# Patient Record
Sex: MALE | Race: WHITE | NOT HISPANIC OR LATINO | Employment: STUDENT | URBAN - METROPOLITAN AREA
[De-identification: names, ages, dates, MRNs, and addresses within clinical notes are randomized per-mention and may not be internally consistent; named-entity substitution may affect disease eponyms.]

---
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
## 2024-04-20 ENCOUNTER — HOSPITAL ENCOUNTER (EMERGENCY)
Facility: HOSPITAL | Age: 19
Discharge: HOME | End: 2024-04-20
Attending: EMERGENCY MEDICINE
Payer: OTHER MISCELLANEOUS

## 2024-04-20 ENCOUNTER — APPOINTMENT (EMERGENCY)
Dept: RADIOLOGY | Facility: HOSPITAL | Age: 19
End: 2024-04-20
Payer: OTHER MISCELLANEOUS

## 2024-04-20 VITALS
OXYGEN SATURATION: 100 % | DIASTOLIC BLOOD PRESSURE: 81 MMHG | SYSTOLIC BLOOD PRESSURE: 156 MMHG | TEMPERATURE: 98.1 F | RESPIRATION RATE: 18 BRPM | HEART RATE: 76 BPM

## 2024-04-20 DIAGNOSIS — S93.401A RIGHT ANKLE SPRAIN: Primary | ICD-10-CM

## 2024-04-20 PROCEDURE — 73590 X-RAY EXAM OF LOWER LEG: CPT

## 2024-04-20 PROCEDURE — 99283 EMERGENCY DEPT VISIT LOW MDM: CPT

## 2024-04-20 PROCEDURE — 73610 X-RAY EXAM OF ANKLE: CPT

## 2024-04-20 PROCEDURE — 73630 X-RAY EXAM OF FOOT: CPT

## 2024-04-20 PROCEDURE — 99284 EMERGENCY DEPT VISIT MOD MDM: CPT | Performed by: EMERGENCY MEDICINE

## 2024-04-20 PROCEDURE — 96372 THER/PROPH/DIAG INJ SC/IM: CPT

## 2024-04-20 RX ORDER — ACETAMINOPHEN 325 MG/1
975 TABLET ORAL ONCE
Status: COMPLETED | OUTPATIENT
Start: 2024-04-20 | End: 2024-04-20

## 2024-04-20 RX ORDER — KETOROLAC TROMETHAMINE 30 MG/ML
30 INJECTION, SOLUTION INTRAMUSCULAR; INTRAVENOUS ONCE
Status: COMPLETED | OUTPATIENT
Start: 2024-04-20 | End: 2024-04-20

## 2024-04-20 RX ADMIN — KETOROLAC TROMETHAMINE 30 MG: 30 INJECTION, SOLUTION INTRAMUSCULAR; INTRAVENOUS at 19:28

## 2024-04-20 RX ADMIN — ACETAMINOPHEN 325MG 975 MG: 325 TABLET ORAL at 19:27

## 2024-04-20 NOTE — Clinical Note
Chris Foreman was seen and treated in our emergency department on 4/20/2024.        No work until cleared by Family Doctor/Orthopedics        Diagnosis:     Chris  .    He may return on this date:          If you have any questions or concerns, please don't hesitate to call.      Lucio Tineo MD    ______________________________           _______________          _______________  Hospital Representative                              Date                                Time

## 2024-04-20 NOTE — ED PROVIDER NOTES
History  Chief Complaint   Patient presents with    Ankle Pain     Pt reports lost footing on steps and right ankle twisted, reports pain and swelling. EMS stabilized ankle pta.         History provided by:  Patient   used: No    Ankle Pain  Location:  Ankle  Time since incident:  1 hour  Injury: yes    Mechanism of injury comment:  Twisted Right ankle while coming down steps  Ankle location:  R ankle  Pain details:     Quality:  Aching    Radiates to:  Does not radiate    Severity:  Moderate    Onset quality:  Gradual    Duration:  1 hour    Timing:  Constant    Progression:  Unchanged  Chronicity:  New  Dislocation: no    Foreign body present:  Unable to specify  Tetanus status:  Unknown  Prior injury to area:  Unable to specify  Relieved by:  Nothing  Worsened by:  Nothing  Ineffective treatments:  None tried  Associated symptoms: decreased ROM and swelling    Associated symptoms: no back pain, no fever, no neck pain, no numbness and no tingling    Swelling:     Location: Right ankle.    Onset quality:  Sudden    Duration:  1 hour    Timing:  Constant    Progression:  Unchanged    Chronicity:  New  Risk factors: obesity        None       History reviewed. No pertinent past medical history.    History reviewed. No pertinent surgical history.    History reviewed. No pertinent family history.  I have reviewed and agree with the history as documented.    E-Cigarette/Vaping     E-Cigarette/Vaping Substances     Social History     Tobacco Use    Smoking status: Never    Smokeless tobacco: Never   Substance Use Topics    Alcohol use: Not Currently    Drug use: Not Currently       Review of Systems   Constitutional:  Negative for chills and fever.   HENT:  Negative for facial swelling, sore throat and trouble swallowing.    Eyes:  Negative for pain and visual disturbance.   Respiratory:  Negative for cough, chest tightness and shortness of breath.    Cardiovascular:  Negative for chest pain and leg  swelling.   Gastrointestinal:  Negative for abdominal pain, diarrhea, nausea and vomiting.   Genitourinary:  Negative for dysuria and flank pain.   Musculoskeletal:  Negative for back pain, neck pain and neck stiffness.        Right ankle pain   Skin:  Negative for pallor and rash.   Allergic/Immunologic: Negative for environmental allergies and immunocompromised state.   Neurological:  Negative for dizziness and headaches.   Hematological:  Negative for adenopathy. Does not bruise/bleed easily.   Psychiatric/Behavioral:  Negative for agitation and behavioral problems.    All other systems reviewed and are negative.      Physical Exam  Physical Exam  Vitals and nursing note reviewed.   Constitutional:       General: He is not in acute distress.     Appearance: He is well-developed.   HENT:      Head: Normocephalic and atraumatic.   Eyes:      Extraocular Movements: Extraocular movements intact.   Cardiovascular:      Rate and Rhythm: Normal rate and regular rhythm.   Pulmonary:      Effort: Pulmonary effort is normal. No respiratory distress.   Abdominal:      Palpations: Abdomen is soft.      Tenderness: There is no abdominal tenderness. There is no guarding or rebound.   Musculoskeletal:      Cervical back: Normal range of motion and neck supple.      Comments: Right ankle: Swelling and tenderness over the medial malleolus, no deformity; no right shin or upper fibular/tibial tenderness, neurovascular intact distally   Skin:     General: Skin is warm and dry.   Neurological:      General: No focal deficit present.      Mental Status: He is alert and oriented to person, place, and time.   Psychiatric:         Mood and Affect: Mood normal.         Behavior: Behavior normal.         Vital Signs  ED Triage Vitals [04/20/24 1911]   Temperature Pulse Respirations Blood Pressure SpO2   98.1 °F (36.7 °C) 76 18 156/81 100 %      Temp Source Heart Rate Source Patient Position - Orthostatic VS BP Location FiO2 (%)   Oral  "Monitor Sitting Right arm --      Pain Score       7           Vitals:    04/20/24 1911   BP: 156/81   Pulse: 76   Patient Position - Orthostatic VS: Sitting         Visual Acuity      ED Medications  Medications   ketorolac (TORADOL) injection 30 mg (30 mg Intramuscular Given 4/20/24 1928)   acetaminophen (TYLENOL) tablet 975 mg (975 mg Oral Given 4/20/24 1927)       Diagnostic Studies  Results Reviewed       None                   XR ankle 3+ views RIGHT    (Results Pending)   XR tibia fibula 2 views RIGHT    (Results Pending)   XR foot 3+ views RIGHT    (Results Pending)              Procedures  Procedures         ED Course  ED Course as of 04/20/24 2102   Sat Apr 20, 2024 2004 XR foot 3+ views RIGHT   2007 XR ankle 3+ views RIGHT   2007 XR tibia fibula 2 views RIGHT  X-rays of right ankle, right foot, tibia-fibula intimately reviewed, no displaced fracture noted.   2100 Patient reevaluated, informed about the x-ray interpretation, patient is pretty tender over the right medial malleolus, will put sugar-tong's splint, give crutches, follow-up with orthopedics, advised OTC pain meds, ice, elevation.         HALIMA      Flowsheet Row Most Recent Value   HALIMA Initial Screen: During the past 12 months, did you:    1. Drink any alcohol (more than a few sips)?  No Filed at: 04/20/2024 1913   2. Smoke any marijuana or hashish No Filed at: 04/20/2024 1913   3. Use anything else to get high? (\"anything else\" includes illegal drugs, over the counter and prescription drugs, and things that you sniff or 'gomez')? No Filed at: 04/20/2024 1913                                            Medical Decision Making  Patient with twisted right ankle while coming down steps, complaint of pain to the right ankle, did not fall, brought by EMS from work.  On exam, patient has swelling and tenderness over right ankle medially, no deformity, no proximal fibular tenderness.  Differential diagnosis: Ankle sprain, rule out fracture, will " get x-rays, give pain meds, ice.    Problems Addressed:  Right ankle sprain: acute illness or injury    Amount and/or Complexity of Data Reviewed  Radiology: ordered and independent interpretation performed. Decision-making details documented in ED Course.    Risk  OTC drugs.  Prescription drug management.             Disposition  Final diagnoses:   Right ankle sprain     Time reflects when diagnosis was documented in both MDM as applicable and the Disposition within this note       Time User Action Codes Description Comment    4/20/2024  7:55 PM Lucio Tineo Add [S93.401A] Right ankle sprain           ED Disposition       ED Disposition   Discharge    Condition   Stable    Date/Time   Sat Apr 20, 2024 2101    Comment   Chris Foreman discharge to home/self care.                   Follow-up Information       Follow up With Specialties Details Why Contact Info Additional Information    Ronal Rose MD Internal Medicine Schedule an appointment as soon as possible for a visit   [address] [place] [address] [address]  [facility] [address]  [phone]       [facility] Orthopedic [facility] Orthopedic Surgery Schedule an appointment as soon as possible for a visit   [address]  [address]  [facility] [address] [phone] [facility] Orthopedic [facility] [place] [address] [address], [address], [place], [place], [address] [phone]            Patient's Medications    No medications on file       No discharge procedures on file.    PDMP Review       None            ED Provider  Electronically Signed by             Lucio Tineo MD  04/21/24 1679

## 2024-04-21 NOTE — DISCHARGE INSTRUCTIONS
Please take over-the-counter Tylenol and ibuprofen for pain, keep the affected area elevated, apply ice at intervals, follow-up with primary doctor and orthopedics.

## 2024-06-16 ENCOUNTER — HOSPITAL ENCOUNTER (EMERGENCY)
Facility: HOSPITAL | Age: 19
Discharge: HOME | End: 2024-06-16
Attending: EMERGENCY MEDICINE
Payer: MEDICARE

## 2024-06-16 VITALS
RESPIRATION RATE: 18 BRPM | DIASTOLIC BLOOD PRESSURE: 68 MMHG | WEIGHT: 315 LBS | HEART RATE: 89 BPM | OXYGEN SATURATION: 96 % | SYSTOLIC BLOOD PRESSURE: 131 MMHG | TEMPERATURE: 98 F

## 2024-06-16 DIAGNOSIS — L03.114 CELLULITIS OF LEFT UPPER EXTREMITY: Primary | ICD-10-CM

## 2024-06-16 PROCEDURE — 99282 EMERGENCY DEPT VISIT SF MDM: CPT

## 2024-06-16 PROCEDURE — 99284 EMERGENCY DEPT VISIT MOD MDM: CPT | Performed by: EMERGENCY MEDICINE

## 2024-06-16 RX ORDER — CEPHALEXIN 500 MG/1
500 CAPSULE ORAL EVERY 6 HOURS SCHEDULED
Qty: 28 CAPSULE | Refills: 0 | Status: SHIPPED | OUTPATIENT
Start: 2024-06-16 | End: 2024-06-23

## 2024-06-16 RX ORDER — NAPROXEN 500 MG/1
500 TABLET ORAL EVERY 12 HOURS PRN
Qty: 15 TABLET | Refills: 0 | Status: SHIPPED | OUTPATIENT
Start: 2024-06-16

## 2024-06-16 NOTE — ED PROVIDER NOTES
History  Chief Complaint   Patient presents with    Skin Problem     Pt states that he got a new tattoo about a week ago, c/o swelling and redness surrounding the area. C/o pain when moving arm.      18-year-old male with no past medical history presents to the ED complaining of pain and redness around new tattoo dorsal aspect left forearm.  He states he got the tattoo about a week ago and a few days ago noticed some redness, swelling and pain around part of the tattoo.  He states he is also noticed some drainage.  No fevers or chills.  No nausea or vomiting.      Medical Problem  Location:  Dorsal aspect left forearm  Onset quality:  Gradual  Duration:  2 days  Timing:  Constant  Progression:  Worsening  Chronicity:  New  Context:  Tattoo 1 week ago.  Now over the last 2 days noticed redness around tattoo with some drainage, pain and swelling  Ineffective treatments:  Nothing tried  Associated symptoms: no fever, no headaches, no nausea and no vomiting        None       History reviewed. No pertinent past medical history.    History reviewed. No pertinent surgical history.    History reviewed. No pertinent family history.  I have reviewed and agree with the history as documented.    E-Cigarette/Vaping     E-Cigarette/Vaping Substances     Social History     Tobacco Use    Smoking status: Never    Smokeless tobacco: Never   Substance Use Topics    Alcohol use: Not Currently    Drug use: Not Currently       Review of Systems   Constitutional: Negative.  Negative for chills and fever.   HENT: Negative.     Eyes: Negative.    Respiratory: Negative.     Cardiovascular: Negative.    Gastrointestinal: Negative.  Negative for nausea and vomiting.   Genitourinary: Negative.    Musculoskeletal:  Negative for neck pain.   Skin: Negative.    Allergic/Immunologic: Negative.    Neurological: Negative.  Negative for weakness, numbness and headaches.   Hematological: Negative.    Psychiatric/Behavioral: Negative.     All other  systems reviewed and are negative.      Physical Exam  Physical Exam  Vitals and nursing note reviewed.   Constitutional:       General: He is awake. He is not in acute distress.     Appearance: Normal appearance. He is well-developed and overweight. He is not ill-appearing, toxic-appearing or diaphoretic.   HENT:      Head: Normocephalic and atraumatic.      Right Ear: External ear normal.      Left Ear: External ear normal.      Nose: Nose normal.      Mouth/Throat:      Mouth: Oropharynx is clear and moist.   Eyes:      General: No scleral icterus.     Extraocular Movements: EOM normal.      Conjunctiva/sclera: Conjunctivae normal.   Neck:      Thyroid: No thyromegaly.      Vascular: No JVD.   Musculoskeletal:         General: Swelling and tenderness (Over tattoo) present. No edema. Normal range of motion.      Comments: See clinical image on chart.  Minimal surrounding erythema from distal aspect tattoo.  No induration or fluctuance.  No drainage noted   Skin:     General: Skin is warm and dry.      Coloration: Skin is not jaundiced or pale.      Findings: No bruising, erythema, lesion or rash.   Neurological:      General: No focal deficit present.      Mental Status: He is alert and oriented to person, place, and time.      Motor: No weakness.      Deep Tendon Reflexes: Reflexes are normal and symmetric.   Psychiatric:         Mood and Affect: Mood and affect and mood normal.         Behavior: Behavior is cooperative.         Vital Signs  ED Triage Vitals [06/16/24 1709]   Temperature Pulse Respirations Blood Pressure SpO2   98 °F (36.7 °C) 89 18 131/68 96 %      Temp Source Heart Rate Source Patient Position - Orthostatic VS BP Location FiO2 (%)   Oral Monitor -- -- --      Pain Score       --           Vitals:    06/16/24 1709   BP: 131/68   Pulse: 89         Visual Acuity      ED Medications  Medications - No data to display    Diagnostic Studies  Results Reviewed       None                   No orders to  display              Procedures  Procedures         ED Course                                             Medical Decision Making  18-year-old male presents to the ED with pain, redness and drainage to dorsal aspect left wrist at site of tattoo.  He got the tattoo about a week ago.  No fevers or chills.  On exam he looks well in no distress.  He does have some mild erythema surrounding the distal aspect of the tattoo.  No induration, fluctuance or active drainage.  Will treat for cellulitis.  Vital signs are stable.  Patient is stable for discharge             Disposition  Final diagnoses:   Cellulitis of left upper extremity     Time reflects when diagnosis was documented in both MDM as applicable and the Disposition within this note       Time User Action Codes Description Comment    6/16/2024  5:30 PM Meliza Toure Add [L03.114] Cellulitis of left upper extremity           ED Disposition       ED Disposition   Discharge    Condition   Good    Date/Time   Sun Jun 16, 2024  5:30 PM    Comment   Chris Foreman discharge to home/self care.                   Follow-up Information       Follow up With Specialties Details Why Contact Info    Ronal Rose MD Internal Medicine Schedule an appointment as soon as possible for a visit in 2 days If symptoms worsen or return to the emergency department [address] [place] [address]  [phone]              Patient's Medications   Discharge Prescriptions    CEPHALEXIN (KEFLEX) 500 MG CAPSULE    Take 1 capsule (500 mg total) by mouth every 6 (six) hours for 7 days       Start Date: 6/16/2024 End Date: 6/23/2024       Order Dose: 500 mg       Quantity: 28 capsule    Refills: 0    NAPROXEN (NAPROSYN) 500 MG TABLET    Take 1 tablet (500 mg total) by mouth every 12 (twelve) hours as needed for moderate pain or mild pain       Start Date: 6/16/2024 End Date: --       Order Dose: 500 mg       Quantity: 15 tablet    Refills: 0       No discharge  procedures on file.    PDMP Review       None            ED Provider  Electronically Signed by             Meliza Toure,   06/16/24 3698

## 2024-06-16 NOTE — Clinical Note
Chris Foreman was seen and treated in our emergency department on 6/16/2024.    No restrictions            Diagnosis:     Chris  may return to work on return date.    He may return on this date: 06/21/2024         If you have any questions or concerns, please don't hesitate to call.      Meliza Toure, DO    ______________________________           _______________          _______________  Hospital Representative                              Date                                Time

## 2024-12-18 ENCOUNTER — APPOINTMENT (EMERGENCY)
Dept: RADIOLOGY | Facility: HOSPITAL | Age: 19
End: 2024-12-18
Payer: MEDICARE

## 2024-12-18 ENCOUNTER — HOSPITAL ENCOUNTER (EMERGENCY)
Facility: HOSPITAL | Age: 19
Discharge: HOME | End: 2024-12-19
Attending: EMERGENCY MEDICINE
Payer: MEDICARE

## 2024-12-18 VITALS
DIASTOLIC BLOOD PRESSURE: 98 MMHG | HEART RATE: 82 BPM | OXYGEN SATURATION: 98 % | RESPIRATION RATE: 18 BRPM | WEIGHT: 315 LBS | TEMPERATURE: 98.1 F | SYSTOLIC BLOOD PRESSURE: 132 MMHG

## 2024-12-18 DIAGNOSIS — M54.9 BACK PAIN: Primary | ICD-10-CM

## 2024-12-18 LAB
ALBUMIN SERPL BCG-MCNC: 4.8 G/DL (ref 3.5–5)
ALP SERPL-CCNC: 56 U/L (ref 34–104)
ALT SERPL W P-5'-P-CCNC: 31 U/L (ref 7–52)
ANION GAP SERPL CALCULATED.3IONS-SCNC: 8 MMOL/L (ref 4–13)
AST SERPL W P-5'-P-CCNC: 20 U/L (ref 13–39)
BACTERIA UR QL AUTO: ABNORMAL /HPF
BASOPHILS # BLD AUTO: 0.1 THOUSANDS/ΜL (ref 0–0.1)
BASOPHILS NFR BLD AUTO: 1 % (ref 0–1)
BILIRUB SERPL-MCNC: 0.39 MG/DL (ref 0.2–1)
BILIRUB UR QL STRIP: NEGATIVE
BUN SERPL-MCNC: 11 MG/DL (ref 5–25)
CALCIUM SERPL-MCNC: 9.4 MG/DL (ref 8.4–10.2)
CHLORIDE SERPL-SCNC: 105 MMOL/L (ref 96–108)
CLARITY UR: CLEAR
CO2 SERPL-SCNC: 25 MMOL/L (ref 21–32)
COLOR UR: YELLOW
CREAT SERPL-MCNC: 0.85 MG/DL (ref 0.6–1.3)
EOSINOPHIL # BLD AUTO: 0.18 THOUSAND/ΜL (ref 0–0.61)
EOSINOPHIL NFR BLD AUTO: 2 % (ref 0–6)
ERYTHROCYTE [DISTWIDTH] IN BLOOD BY AUTOMATED COUNT: 11.9 % (ref 11.6–15.1)
GFR SERPL CREATININE-BSD FRML MDRD: 125 ML/MIN/1.73SQ M
GLUCOSE SERPL-MCNC: 95 MG/DL (ref 65–140)
GLUCOSE UR STRIP-MCNC: NEGATIVE MG/DL
HCT VFR BLD AUTO: 44.9 % (ref 36.5–49.3)
HGB BLD-MCNC: 15.7 G/DL (ref 12–17)
HGB UR QL STRIP.AUTO: NEGATIVE
IMM GRANULOCYTES # BLD AUTO: 0.02 THOUSAND/UL (ref 0–0.2)
IMM GRANULOCYTES NFR BLD AUTO: 0 % (ref 0–2)
KETONES UR STRIP-MCNC: NEGATIVE MG/DL
LEUKOCYTE ESTERASE UR QL STRIP: NEGATIVE
LYMPHOCYTES # BLD AUTO: 2.45 THOUSANDS/ΜL (ref 0.6–4.47)
LYMPHOCYTES NFR BLD AUTO: 26 % (ref 14–44)
MCH RBC QN AUTO: 28.8 PG (ref 26.8–34.3)
MCHC RBC AUTO-ENTMCNC: 35 G/DL (ref 31.4–37.4)
MCV RBC AUTO: 82 FL (ref 82–98)
MONOCYTES # BLD AUTO: 0.74 THOUSAND/ΜL (ref 0.17–1.22)
MONOCYTES NFR BLD AUTO: 8 % (ref 4–12)
MUCOUS THREADS UR QL AUTO: ABNORMAL
NEUTROPHILS # BLD AUTO: 5.86 THOUSANDS/ΜL (ref 1.85–7.62)
NEUTS SEG NFR BLD AUTO: 63 % (ref 43–75)
NITRITE UR QL STRIP: NEGATIVE
NON-SQ EPI CELLS URNS QL MICRO: ABNORMAL /HPF
NRBC BLD AUTO-RTO: 0 /100 WBCS
OTHER STN SPEC: ABNORMAL
PH UR STRIP.AUTO: 6.5 [PH]
PLATELET # BLD AUTO: 280 THOUSANDS/UL (ref 149–390)
PMV BLD AUTO: 10.3 FL (ref 8.9–12.7)
POTASSIUM SERPL-SCNC: 3.9 MMOL/L (ref 3.5–5.3)
PROT SERPL-MCNC: 7.6 G/DL (ref 6.4–8.4)
PROT UR STRIP-MCNC: ABNORMAL MG/DL
RBC # BLD AUTO: 5.46 MILLION/UL (ref 3.88–5.62)
RBC #/AREA URNS AUTO: ABNORMAL /HPF
SODIUM SERPL-SCNC: 138 MMOL/L (ref 135–147)
SP GR UR STRIP.AUTO: 1.03 (ref 1–1.03)
UROBILINOGEN UR STRIP-ACNC: <2 MG/DL
WBC # BLD AUTO: 9.35 THOUSAND/UL (ref 4.31–10.16)
WBC #/AREA URNS AUTO: ABNORMAL /HPF

## 2024-12-18 PROCEDURE — 85025 COMPLETE CBC W/AUTO DIFF WBC: CPT

## 2024-12-18 PROCEDURE — 72100 X-RAY EXAM L-S SPINE 2/3 VWS: CPT

## 2024-12-18 PROCEDURE — 80053 COMPREHEN METABOLIC PANEL: CPT

## 2024-12-18 PROCEDURE — 96374 THER/PROPH/DIAG INJ IV PUSH: CPT

## 2024-12-18 PROCEDURE — 81001 URINALYSIS AUTO W/SCOPE: CPT

## 2024-12-18 PROCEDURE — 99284 EMERGENCY DEPT VISIT MOD MDM: CPT | Performed by: EMERGENCY MEDICINE

## 2024-12-18 PROCEDURE — 96375 TX/PRO/DX INJ NEW DRUG ADDON: CPT

## 2024-12-18 PROCEDURE — 99284 EMERGENCY DEPT VISIT MOD MDM: CPT

## 2024-12-18 PROCEDURE — NC001 PR NO CHARGE: Performed by: EMERGENCY MEDICINE

## 2024-12-18 PROCEDURE — 36415 COLL VENOUS BLD VENIPUNCTURE: CPT

## 2024-12-18 RX ORDER — METHOCARBAMOL 500 MG/1
1000 TABLET, FILM COATED ORAL ONCE
Status: COMPLETED | OUTPATIENT
Start: 2024-12-18 | End: 2024-12-18

## 2024-12-18 RX ORDER — METHOCARBAMOL 500 MG/1
500 TABLET, FILM COATED ORAL 2 TIMES DAILY
Qty: 20 TABLET | Refills: 0 | Status: SHIPPED | OUTPATIENT
Start: 2024-12-18

## 2024-12-18 RX ORDER — METHYLPREDNISOLONE 4 MG/1
TABLET ORAL
Qty: 21 TABLET | Refills: 0 | Status: SHIPPED | OUTPATIENT
Start: 2024-12-18

## 2024-12-18 RX ORDER — LIDOCAINE 50 MG/G
1 PATCH TOPICAL ONCE
Status: DISCONTINUED | OUTPATIENT
Start: 2024-12-18 | End: 2024-12-19 | Stop reason: HOSPADM

## 2024-12-18 RX ORDER — KETOROLAC TROMETHAMINE 30 MG/ML
15 INJECTION, SOLUTION INTRAMUSCULAR; INTRAVENOUS ONCE
Status: DISCONTINUED | OUTPATIENT
Start: 2024-12-18 | End: 2024-12-18

## 2024-12-18 RX ORDER — DEXAMETHASONE SODIUM PHOSPHATE 10 MG/ML
10 INJECTION, SOLUTION INTRAMUSCULAR; INTRAVENOUS ONCE
Status: COMPLETED | OUTPATIENT
Start: 2024-12-18 | End: 2024-12-18

## 2024-12-18 RX ORDER — KETOROLAC TROMETHAMINE 30 MG/ML
15 INJECTION, SOLUTION INTRAMUSCULAR; INTRAVENOUS ONCE
Status: COMPLETED | OUTPATIENT
Start: 2024-12-18 | End: 2024-12-18

## 2024-12-18 RX ORDER — LIDOCAINE 50 MG/G
1 PATCH TOPICAL DAILY
Qty: 30 PATCH | Refills: 0 | Status: SHIPPED | OUTPATIENT
Start: 2024-12-18

## 2024-12-18 RX ORDER — ACETAMINOPHEN 325 MG/1
650 TABLET ORAL ONCE
Status: COMPLETED | OUTPATIENT
Start: 2024-12-18 | End: 2024-12-18

## 2024-12-18 RX ADMIN — DEXAMETHASONE SODIUM PHOSPHATE 10 MG: 10 INJECTION INTRAMUSCULAR; INTRAVENOUS at 23:07

## 2024-12-18 RX ADMIN — METHOCARBAMOL 1000 MG: 500 TABLET ORAL at 23:07

## 2024-12-18 RX ADMIN — ACETAMINOPHEN 650 MG: 325 TABLET, FILM COATED ORAL at 23:06

## 2024-12-18 RX ADMIN — LIDOCAINE 1 PATCH: 50 PATCH TOPICAL at 21:49

## 2024-12-18 RX ADMIN — KETOROLAC TROMETHAMINE 15 MG: 30 INJECTION, SOLUTION INTRAMUSCULAR; INTRAVENOUS at 21:58

## 2024-12-19 ENCOUNTER — TELEPHONE (OUTPATIENT)
Dept: PHYSICAL THERAPY | Facility: OTHER | Age: 19
End: 2024-12-19

## 2024-12-19 NOTE — DISCHARGE INSTRUCTIONS
Follow-up with PCP.  Comp spine referral placed.  Take Medrol Dosepak as prescribed.  Robaxin and Lidoderm patch as prescribed as needed for pain.  Return to ED if symptoms worsen, fail to improve, or develop as listed in follow-up section.

## 2024-12-19 NOTE — ED PROVIDER NOTES
Time reflects when diagnosis was documented in both MDM as applicable and the Disposition within this note       Time User Action Codes Description Comment    12/18/2024 11:50 PM Mona Zaidi Add [M54.9] Back pain           ED Disposition       ED Disposition   Discharge    Condition   Stable    Date/Time   Wed Dec 18, 2024 11:50 PM    Comment   Chris Foreman discharge to home/self care.                   Assessment & Plan       Medical Decision Making  Patient is a 18 y/o male presenting with left lower back pain starting this morning.  Pain has progressively worsened.  Reports associated nausea.  Denies fever, chills, sweats, radiation of pain, chest pain, difficulty breathing, vomiting, burning with urination, blood in the urine, etc.  On physical examination abdomen completely nontender to palpation.  No overlying skin changes.  No spinal tenderness to palpation.  No CVA tenderness.  No tenderness to palpation of paraspinal muscles.  No overlying skin changes or deformities appreciated. Patient has full ROM, strength, pulses, and sensation of bilateral lower extremities.  Will get CBC, CMP and urine analysis.  Will give Toradol and Lidoderm patch.  CBC, CMP and urine analysis unremarkable.  Patient states his pain is a little better following Toradol and Lidoderm patch.  X-ray lumbar spine ordered and , decadron, tylenol, and robaxin ordered by Dr. Phillip.   X-ray revealed no acute osseous abnormalities, mild degenerative changes. as interpreted by me.  Patient states pain has improved following medication management.  Findings most consistent with muscle strain.  Will place comprehensive spine referral, prescribe Lidoderm patches, Robaxin, and Medrol Dosepak.  Discussed case with Dr. Phillip, who also saw and evaluated the patient.     Discussed findings from the visit with the patient.  We had a conversation regarding supportive care and indications for return such as urinary incontinence, urinary retention,  "saddle anesthesia, loss of bowel control, inability to walk, intense pain, vomiting, fever, chills, sweats, developing redness, swelling, numbness/tingling, etc.  Recommended appropriate follow-up.  Patient and/or family understand and agree with plan.    Portions of the record may have been created with voice recognition software. Occasional use of the incorrect word or \"sound a like\" substitutions may have occurred due to the inherent limitations of voice recognition software. Read the chart carefully and recognize, using context, where substitutions have occurred.       Amount and/or Complexity of Data Reviewed  Labs: ordered.  Radiology: ordered.    Risk  OTC drugs.  Prescription drug management.             Medications   ketorolac (TORADOL) injection 15 mg (15 mg Intravenous Given 12/18/24 2158)   methocarbamol (ROBAXIN) tablet 1,000 mg (1,000 mg Oral Given 12/18/24 2307)   acetaminophen (TYLENOL) tablet 650 mg (650 mg Oral Given 12/18/24 2306)   dexamethasone (PF) (DECADRON) injection 10 mg (10 mg Intravenous Given 12/18/24 2307)       ED Risk Strat Scores                                              History of Present Illness       Chief Complaint   Patient presents with    Flank Pain     Pt reports \"left sided flank pain since this am, tylenol 2 hrs pta\" +n. Denies urinary symptoms.       History reviewed. No pertinent past medical history.   History reviewed. No pertinent surgical history.   History reviewed. No pertinent family history.   Social History     Tobacco Use    Smoking status: Never    Smokeless tobacco: Never   Substance Use Topics    Alcohol use: Not Currently    Drug use: Not Currently      E-Cigarette/Vaping      E-Cigarette/Vaping Substances      I have reviewed and agree with the history as documented.     Patient is a 18 y/o male with no significant PMH, presenting with left lower back pain starting this morning. Patient states pain started as a dull pain and progressively became more " intense throughout the day. Denies any radiation of pain. Reports nausea and shortness of breath associated with pain but denies otherwise. One episode of diarrhea prior to arrival. Denies any vomiting, fever, chills, sweats, abdominal pain, constipation, blood in stool, burning with urination, blood in the urine, rashes, headaches, lightheaded/dizziness, difficulty breathing, chest pain, cough, congestion, sore throat, runny nose. Denies urinary incontinence, urinary retention, saddle anesthesia, or loss of bowel control. Denies any fall or trauma.   Denies history of kidney stones, denies similar pain in past. Denies any history of abdominal surgeries in the past. Has been taking aleve without relief.       Flank Pain  Associated symptoms: diarrhea and nausea    Associated symptoms: no chest pain, no chills, no constipation, no cough, no dysuria, no fever, no hematuria, no sore throat and no vomiting        Review of Systems   Constitutional:  Negative for chills and fever.   HENT:  Negative for congestion, drooling, ear pain, rhinorrhea, sinus pressure, sinus pain, sore throat, trouble swallowing and voice change.    Eyes:  Negative for pain and visual disturbance.   Respiratory:  Negative for cough.    Cardiovascular:  Negative for chest pain and palpitations.   Gastrointestinal:  Positive for diarrhea and nausea. Negative for abdominal pain, blood in stool, constipation and vomiting.   Genitourinary:  Positive for flank pain. Negative for difficulty urinating, dysuria, hematuria, testicular pain and urgency.   Musculoskeletal:  Negative for arthralgias, back pain, neck pain and neck stiffness.   Skin:  Negative for color change and rash.   Neurological:  Negative for dizziness, seizures, syncope, weakness, light-headedness, numbness and headaches.   All other systems reviewed and are negative.          Objective       ED Triage Vitals [12/18/24 2037]   Temperature Pulse Blood Pressure Respirations SpO2 Patient  Position - Orthostatic VS   98.1 °F (36.7 °C) 82 132/98 18 98 % Sitting      Temp Source Heart Rate Source BP Location FiO2 (%) Pain Score    Oral Monitor Right arm -- 10 - Worst Possible Pain      Vitals      Date and Time Temp Pulse SpO2 Resp BP Pain Score FACES Pain Rating User   12/18/24 2158 -- -- -- -- -- 10 - Worst Possible Pain -- ZC   12/18/24 2037 98.1 °F (36.7 °C) 82 98 % 18 132/98 10 - Worst Possible Pain -- NM            Physical Exam  Vitals and nursing note reviewed.   Constitutional:       General: He is not in acute distress.     Appearance: He is well-developed. He is not ill-appearing or toxic-appearing.   HENT:      Head: Normocephalic and atraumatic.      Right Ear: External ear normal.      Left Ear: External ear normal.      Nose: Nose normal.      Mouth/Throat:      Mouth: Mucous membranes are moist.      Pharynx: No oropharyngeal exudate or posterior oropharyngeal erythema.   Eyes:      General: No scleral icterus.        Right eye: No discharge.         Left eye: No discharge.      Extraocular Movements: Extraocular movements intact.      Conjunctiva/sclera: Conjunctivae normal.   Cardiovascular:      Rate and Rhythm: Normal rate and regular rhythm.      Pulses: Normal pulses.      Heart sounds: Normal heart sounds. No murmur heard.  Pulmonary:      Effort: Pulmonary effort is normal. No respiratory distress.      Breath sounds: Normal breath sounds. No wheezing, rhonchi or rales.   Abdominal:      General: Bowel sounds are normal. There is no distension.      Palpations: Abdomen is soft.      Tenderness: There is no abdominal tenderness. There is no right CVA tenderness, left CVA tenderness, guarding or rebound.   Musculoskeletal:         General: No swelling.      Cervical back: Normal, normal range of motion and neck supple. No swelling, edema, deformity, erythema, signs of trauma, lacerations, spasms, tenderness or bony tenderness. Normal range of motion.      Thoracic back: Normal. No  swelling, edema, deformity, signs of trauma, lacerations, spasms, tenderness or bony tenderness. Normal range of motion.      Lumbar back: Normal. No swelling, edema, deformity, signs of trauma, lacerations, spasms, tenderness or bony tenderness. Normal range of motion.      Comments: No overlying skin changes such as erythema, ecchymosis, swelling, abrasions, or deformities appreciated.    Skin:     General: Skin is warm and dry.      Capillary Refill: Capillary refill takes less than 2 seconds.   Neurological:      General: No focal deficit present.      Mental Status: He is alert.   Psychiatric:         Mood and Affect: Mood normal.         Results Reviewed       Procedure Component Value Units Date/Time    Comprehensive metabolic panel [[id number]] Collected: 12/18/24 2201    Lab Status: Final result Specimen: Blood from Arm, Right Updated: 12/18/24 2220     Sodium 138 mmol/L      Potassium 3.9 mmol/L      Chloride 105 mmol/L      CO2 25 mmol/L      ANION GAP 8 mmol/L      BUN 11 mg/dL      Creatinine 0.85 mg/dL      Glucose 95 mg/dL      Calcium 9.4 mg/dL      AST 20 U/L      ALT 31 U/L      Alkaline Phosphatase 56 U/L      Total Protein 7.6 g/dL      Albumin 4.8 g/dL      Total Bilirubin 0.39 mg/dL      eGFR 125 ml/min/1.73sq m     Narrative:      [employer] guidelines for Chronic Kidney Disease (CKD):     Stage 1 with normal or high GFR (GFR > 90 mL/min/1.73 square meters)    Stage 2 Mild CKD (GFR = 60-89 mL/min/1.73 square meters)    Stage 3A Moderate CKD (GFR = 45-59 mL/min/1.73 square meters)    Stage 3B Moderate CKD (GFR = 30-44 mL/min/1.73 square meters)    Stage 4 Severe CKD (GFR = 15-29 mL/min/1.73 square meters)    Stage 5 End Stage CKD (GFR <15 mL/min/1.73 square meters)  Note: GFR calculation is accurate only with a steady state creatinine    Urine Microscopic [[id number]]  (Abnormal) Collected: 12/18/24 2149    Lab Status: Final result Specimen: Urine, Clean Catch Updated:  12/18/24 2216     RBC, UA 1-2 /hpf      WBC, UA 1-2 /hpf      Epithelial Cells Occasional /hpf      Bacteria, UA Occasional /hpf      MUCUS THREADS Innumerable     OTHER OBSERVATIONS Sperm Present    UA w Reflex to Microscopic w Reflex to Culture [[id number]]  (Abnormal) Collected: 12/18/24 2149    Lab Status: Final result Specimen: Urine, Clean Catch Updated: 12/18/24 2208     Color, UA Yellow     Clarity, UA Clear     Specific Gravity, UA 1.027     pH, UA 6.5     Leukocytes, UA Negative     Nitrite, UA Negative     Protein, UA Trace mg/dl      Glucose, UA Negative mg/dl      Ketones, UA Negative mg/dl      Urobilinogen, UA <2.0 mg/dl      Bilirubin, UA Negative     Occult Blood, UA Negative    CBC and differential [[id number]] Collected: 12/18/24 2201    Lab Status: Final result Specimen: Blood from Arm, Right Updated: 12/18/24 2206     WBC 9.35 Thousand/uL      RBC 5.46 Million/uL      Hemoglobin 15.7 g/dL      Hematocrit 44.9 %      MCV 82 fL      MCH 28.8 pg      MCHC 35.0 g/dL      RDW 11.9 %      MPV 10.3 fL      Platelets 280 Thousands/uL      nRBC 0 /100 WBCs      Segmented % 63 %      Immature Grans % 0 %      Lymphocytes % 26 %      Monocytes % 8 %      Eosinophils Relative 2 %      Basophils Relative 1 %      Absolute Neutrophils 5.86 Thousands/µL      Absolute Immature Grans 0.02 Thousand/uL      Absolute Lymphocytes 2.45 Thousands/µL      Absolute Monocytes 0.74 Thousand/µL      Eosinophils Absolute 0.18 Thousand/µL      Basophils Absolute 0.10 Thousands/µL             XR spine lumbar 2 or 3 views injury    (Results Pending)       Procedures    ED Medication and Procedure Management   Prior to Admission Medications   Prescriptions Last Dose Informant Patient Reported? Taking?   naproxen (NAPROSYN) 500 mg tablet   No No   Sig: Take 1 tablet (500 mg total) by mouth every 12 (twelve) hours as needed for moderate pain or mild pain      Facility-Administered Medications: None     Discharge Medication List as  of 12/18/2024 11:56 PM        START taking these medications    Details   lidocaine (Lidoderm) 5 % Apply 1 patch topically over 12 hours daily Remove & Discard patch within 12 hours or as directed by MD, Starting Wed 12/18/2024, Normal      methocarbamol (ROBAXIN) 500 mg tablet Take 1 tablet (500 mg total) by mouth 2 (two) times a day, Starting Wed 12/18/2024, Normal      methylPREDNISolone 4 MG tablet therapy pack Use as directed on package, Normal           CONTINUE these medications which have NOT CHANGED    Details   naproxen (NAPROSYN) 500 mg tablet Take 1 tablet (500 mg total) by mouth every 12 (twelve) hours as needed for moderate pain or mild pain, Starting Sun 6/16/2024, Normal             ED SEPSIS DOCUMENTATION   Time reflects when diagnosis was documented in both MDM as applicable and the Disposition within this note       Time User Action Codes Description Comment    12/18/2024 11:50 PM Mona Zaidi Add [M54.9] Back pain                  Mona Zaidi PA-C  12/19/24 0343

## 2024-12-19 NOTE — TELEPHONE ENCOUNTER
Call placed to the patient per [facility] referral.    Spoke with the patient who would like to call [facility] back if needed    [facility] closed. Will assist if a call back is received

## 2024-12-19 NOTE — ED PROVIDER NOTES
"I supervised the Advanced Practitioner.? I performed, in its entirety, the assessment and plan component of the visit.  I agree with the Advanced Practitioner's note with the following assessment and plan:      Patient is a 19-year-old male with no significant past medical history coming in today with nontraumatic left back pain.  Patient states that started this morning and he was becoming more concerned as his mom has a history of kidney infections.  He has no fevers, chills, nausea, vomiting, diarrhea.  The pain is to the left mid thoracic without any radiation into his abdomen, low back, chest or lungs.  He has no chest pain, shortness of breath palpitations or difficulty eating or drinking.    On my exam patient is resting in bed in no distress.  EOMI.  PERRLA.  Patient maintaining airway and secretions.  Patient has full active range of motion of bilateral upper and lower extremities independently and can ambulate with a nonantalgic gait.  Regular rate and rhythm with no murmurs.  Lungs clear to auscultation bilaterally.  Abdomen is obese, soft, nontender with bowel sounds x 4.  He has very mild tenderness along the thoracic spine paraspinal musculature on the left from T7-T10.  No rashes or lesions.  No step-offs palpated along the thoracic spine    Discussed with patient and reviewed his urine and labs.  No obvious infection or acute findings.  X-ray on my read shows concern for mild degenerative disc changes in the thoracic spine however poor x-ray penetration.  He has pain primarily with movement.  Discussed with patient multimodal medication and follow-up with PCP as well as referral through comprehensive spine.    Disclosure: Voice to text software was used in the preparation of this document and could have resulted in translational errors.      Occasional wrong word or \"sound a like\" substitutions may have occurred due to the inherent limitations of voice recognition software.  Read the chart carefully " and recognize, using context, where substitutions have occurred.       I have independently reviewed external records are available to me to the level of detail possible within the time constraints of my patient care responsibilities in the ED.        Catalina Phillip, DO 12/18/24        Catalina Phillip, DO  12/19/24 0035

## 2024-12-22 ENCOUNTER — HOSPITAL ENCOUNTER (EMERGENCY)
Facility: HOSPITAL | Age: 19
Discharge: HOME | End: 2024-12-22
Attending: EMERGENCY MEDICINE
Payer: MEDICARE

## 2024-12-22 ENCOUNTER — APPOINTMENT (EMERGENCY)
Dept: RADIOLOGY | Facility: HOSPITAL | Age: 19
End: 2024-12-22
Payer: MEDICARE

## 2024-12-22 VITALS
WEIGHT: 315 LBS | DIASTOLIC BLOOD PRESSURE: 70 MMHG | TEMPERATURE: 98 F | OXYGEN SATURATION: 97 % | HEART RATE: 74 BPM | RESPIRATION RATE: 22 BRPM | SYSTOLIC BLOOD PRESSURE: 147 MMHG

## 2024-12-22 DIAGNOSIS — R07.9 CHEST PAIN: Primary | ICD-10-CM

## 2024-12-22 LAB
ANION GAP SERPL CALCULATED.3IONS-SCNC: 7 MMOL/L (ref 4–13)
ATRIAL RATE: 70 BPM
BASOPHILS # BLD AUTO: 0.12 THOUSANDS/ÂΜL (ref 0–0.1)
BASOPHILS NFR BLD AUTO: 1 % (ref 0–1)
BUN SERPL-MCNC: 14 MG/DL (ref 5–25)
CALCIUM SERPL-MCNC: 9.3 MG/DL (ref 8.4–10.2)
CARDIAC TROPONIN I PNL SERPL HS: <2 NG/L (ref ?–50)
CHLORIDE SERPL-SCNC: 104 MMOL/L (ref 96–108)
CO2 SERPL-SCNC: 28 MMOL/L (ref 21–32)
CREAT SERPL-MCNC: 0.85 MG/DL (ref 0.6–1.3)
EOSINOPHIL # BLD AUTO: 0.12 THOUSAND/ÂΜL (ref 0–0.61)
EOSINOPHIL NFR BLD AUTO: 1 % (ref 0–6)
ERYTHROCYTE [DISTWIDTH] IN BLOOD BY AUTOMATED COUNT: 12 % (ref 11.6–15.1)
GFR SERPL CREATININE-BSD FRML MDRD: 125 ML/MIN/1.73SQ M
GLUCOSE SERPL-MCNC: 84 MG/DL (ref 65–140)
HCT VFR BLD AUTO: 46.4 % (ref 36.5–49.3)
HGB BLD-MCNC: 16 G/DL (ref 12–17)
IMM GRANULOCYTES # BLD AUTO: 0.04 THOUSAND/UL (ref 0–0.2)
IMM GRANULOCYTES NFR BLD AUTO: 0 % (ref 0–2)
LYMPHOCYTES # BLD AUTO: 3.31 THOUSANDS/ÂΜL (ref 0.6–4.47)
LYMPHOCYTES NFR BLD AUTO: 27 % (ref 14–44)
MCH RBC QN AUTO: 28.9 PG (ref 26.8–34.3)
MCHC RBC AUTO-ENTMCNC: 34.5 G/DL (ref 31.4–37.4)
MCV RBC AUTO: 84 FL (ref 82–98)
MONOCYTES # BLD AUTO: 1.13 THOUSAND/ÂΜL (ref 0.17–1.22)
MONOCYTES NFR BLD AUTO: 9 % (ref 4–12)
NEUTROPHILS # BLD AUTO: 7.49 THOUSANDS/ÂΜL (ref 1.85–7.62)
NEUTS SEG NFR BLD AUTO: 62 % (ref 43–75)
NRBC BLD AUTO-RTO: 0 /100 WBCS
P AXIS: 19 DEGREES
PLATELET # BLD AUTO: 311 THOUSANDS/UL (ref 149–390)
PMV BLD AUTO: 10.5 FL (ref 8.9–12.7)
POTASSIUM SERPL-SCNC: 3.4 MMOL/L (ref 3.5–5.3)
PR INTERVAL: 150 MS
QRS AXIS: 38 DEGREES
QRSD INTERVAL: 94 MS
QT INTERVAL: 386 MS
QTC INTERVAL: 416 MS
RBC # BLD AUTO: 5.54 MILLION/UL (ref 3.88–5.62)
SODIUM SERPL-SCNC: 139 MMOL/L (ref 135–147)
T WAVE AXIS: 28 DEGREES
VENTRICULAR RATE: 70 BPM
WBC # BLD AUTO: 12.21 THOUSAND/UL (ref 4.31–10.16)

## 2024-12-22 PROCEDURE — 99285 EMERGENCY DEPT VISIT HI MDM: CPT | Performed by: PHYSICIAN ASSISTANT

## 2024-12-22 PROCEDURE — 93005 ELECTROCARDIOGRAM TRACING: CPT

## 2024-12-22 PROCEDURE — 99285 EMERGENCY DEPT VISIT HI MDM: CPT

## 2024-12-22 PROCEDURE — 71046 X-RAY EXAM CHEST 2 VIEWS: CPT

## 2024-12-22 PROCEDURE — 85025 COMPLETE CBC W/AUTO DIFF WBC: CPT | Performed by: PHYSICIAN ASSISTANT

## 2024-12-22 PROCEDURE — 84484 ASSAY OF TROPONIN QUANT: CPT | Performed by: PHYSICIAN ASSISTANT

## 2024-12-22 PROCEDURE — 93010 ELECTROCARDIOGRAM REPORT: CPT | Performed by: INTERNAL MEDICINE

## 2024-12-22 PROCEDURE — 36415 COLL VENOUS BLD VENIPUNCTURE: CPT | Performed by: PHYSICIAN ASSISTANT

## 2024-12-22 PROCEDURE — 80048 BASIC METABOLIC PNL TOTAL CA: CPT | Performed by: PHYSICIAN ASSISTANT

## 2024-12-22 NOTE — DISCHARGE INSTRUCTIONS
DISCHARGE INSTRUCTIONS:    FOLLOW UP WITH YOUR PRIMARY CARE PROVIDER OR THE [facility]. MAKE AN APPOINTMENT TO BE SEEN.    REST AND DRINK PLENTY OF FLUIDS.    IF SYMPTOMS WORSEN OR NEW SYMPTOMS ARISE, RETURN TO THE ER TO BE SEEN.

## 2024-12-22 NOTE — ED PROVIDER NOTES
Time reflects when diagnosis was documented in both MDM as applicable and the Disposition within this note       Time User Action Codes Description Comment    12/22/2024  9:19 AM Antonietta Galvan Add [R07.9] Chest pain           ED Disposition       ED Disposition   Discharge    Condition   Stable    Date/Time   Sun Dec 22, 2024  9:19 AM    Comment   Chris Foreman discharge to home/self care.                   Assessment & Plan       Medical Decision Making  19y.o male presents to the ER for chest pain that began around 0100 and last for one hour. Vitals are stable. Patient is in no acute distress. On exam, breathing is non-labored. No tachypnea or accessory muscle use. Lungs are clear. Heart is regular rate and rhythm. Abdomen is soft and non-tender. No guarding, rigidity, distention or pulsatile masses palpated. DDX consists of but not limited to: ACS, msk pain, pulmonary causes. Will check labs and imaging.    0801 WBC(!): 12.21    0801 Hemoglobin: 16.0    0801 Platelet Count: 311    0813 Basic metabolic panel(!) - Normal except for Potassium of 3.4.    0819 hs TnI 0hr: <2    0919 Patient reports feeling well. Informed him of lab and imaging findings. Will discharge.    The management plan was discussed in detail with the patient at bedside and all questions were answered.  Prior to discharge, we provided both verbal and written instructions.  We discussed with the patient the signs and symptoms for which to return to the emergency department.  All questions were answered and patient was comfortable with the plan of care and discharged to home.  Instructed the patient to follow up with the primary care provider and/or specialist provided and their written instructions.  The patient verbalized understanding of our discussion and plan of care, and agrees to return to the Emergency Department for concerns and progression of illness.    At discharge, I instructed the patient to:  -follow up with pcp  -take Tylenol or  "Motrin for pain  -rest and drink plenty of fluids  -return to the ER if symptoms worsened or new symptoms arose  Patient agreed to this plan and was stable at time of discharge.       Problems Addressed:  Chest pain: acute illness or injury    Amount and/or Complexity of Data Reviewed  Independent Historian:      Details: Patient is historian  Labs: ordered. Decision-making details documented in ED Course.  Radiology: ordered and independent interpretation performed.  ECG/medicine tests: ordered and independent interpretation performed.        ED Course as of 12/22/24 0924   Sun Dec 22, 2024   0801 WBC(!): 12.21   0801 Hemoglobin: 16.0   0801 Platelet Count: 311   0813 Basic metabolic panel(!)  Normal except for Potassium of 3.4.   0819 hs TnI 0hr: <2   0919 Patient reports feeling well. Informed him of lab and imaging findings. Will discharge.       Medications - No data to display    ED Risk Strat Scores   HEART Risk Score      Flowsheet Row Most Recent Value   Heart Score Risk Calculator    History 0 Filed at: 12/22/2024 0919   ECG 0 Filed at: 12/22/2024 0919   Age 0 Filed at: 12/22/2024 0919   Risk Factors 1 Filed at: 12/22/2024 0919   Troponin 0 Filed at: 12/22/2024 0919   HEART Score 1 Filed at: 12/22/2024 0919          HEART Risk Score      Flowsheet Row Most Recent Value   Heart Score Risk Calculator    History 0 Filed at: 12/22/2024 0919   ECG 0 Filed at: 12/22/2024 0919   Age 0 Filed at: 12/22/2024 0919   Risk Factors 1 Filed at: 12/22/2024 0919   Troponin 0 Filed at: 12/22/2024 0919   HEART Score 1 Filed at: 12/22/2024 0919              CRAFFT      Flowsheet Row Most Recent Value   CRAFFT Initial Screen: During the past 12 months, did you:    1. Drink any alcohol (more than a few sips)?  No Filed at: 12/22/2024 0641   2. Smoke any marijuana or hashish No Filed at: 12/22/2024 0641   3. Use anything else to get high? (\"anything else\" includes illegal drugs, over the counter and prescription drugs, and " things that you sniff or 'gomez')? No Filed at: 12/22/2024 0641                                          History of Present Illness       Chief Complaint   Patient presents with    Chest Pain     Pt states he has had CP x 5 years.  Was evaluated previously wit no diagnosis   Pt states pain started at 0100 today, on right side of chest. Pt states he had SOB for about a second but feels fine now.          History reviewed. No pertinent past medical history.   History reviewed. No pertinent surgical history.   History reviewed. No pertinent family history.   Social History     Tobacco Use    Smoking status: Never    Smokeless tobacco: Never   Vaping Use    Vaping status: Never Used   Substance Use Topics    Alcohol use: Not Currently    Drug use: Not Currently      E-Cigarette/Vaping    E-Cigarette Use Never User       E-Cigarette/Vaping Substances      I have reviewed and agree with the history as documented.     19y.o male with no significant PMH presents to the ER for chest pain that began around 0100 and lasted for one hour. It then stopped for awhile but then returned. He denies taking any medication for symptoms. He describes his pain as pressure. He denies radiation of pain. Symptoms come and go randomly. He denies PE risk factors. He denies a personal cardiac history. He has multiple family members with stents. Associated symptoms: dyspnea when having pain. He denies fever, chills, URI symptoms, N/V/D, abdominal pain, weakness or paresthesias.      History provided by:  Patient   used: No        Review of Systems   Constitutional:  Negative for activity change, appetite change, chills and fever.   HENT:  Negative for congestion, drooling, ear discharge, ear pain, facial swelling, rhinorrhea and sore throat.    Eyes:  Negative for redness.   Respiratory:  Positive for shortness of breath (intermittently). Negative for cough.    Cardiovascular:  Positive for chest pain (intermittently).  "  Gastrointestinal:  Negative for abdominal pain, diarrhea, nausea and vomiting.   Musculoskeletal:  Negative for neck stiffness.   Skin:  Negative for rash.   Allergic/Immunologic: Negative for food allergies.   Neurological:  Negative for weakness and numbness.           Objective       ED Triage Vitals [12/22/24 0641]   Temperature Pulse Blood Pressure Respirations SpO2 Patient Position - Orthostatic VS   98 °F (36.7 °C) 82 167/91 16 98 % Sitting      Temp Source Heart Rate Source BP Location FiO2 (%) Pain Score    Oral Monitor Right arm -- No Pain      Vitals      Date and Time Temp Pulse SpO2 Resp BP Pain Score FACES Pain Rating User   12/22/24 0715 -- 69 98 % 20 147/70 No Pain -- LAB   12/22/24 0641 98 °F (36.7 °C) 82 98 % 16 167/91 No Pain pt states pain became \"0\" on his walk to the hospital -- LJB            Physical Exam  Vitals and nursing note reviewed.   Constitutional:       General: He is not in acute distress.     Appearance: He is not toxic-appearing.   HENT:      Head: Normocephalic and atraumatic.      Mouth/Throat:      Lips: Pink. No lesions.      Mouth: Mucous membranes are moist.   Eyes:      Conjunctiva/sclera: Conjunctivae normal.   Neck:      Trachea: No tracheal deviation.   Cardiovascular:      Rate and Rhythm: Normal rate and regular rhythm.      Heart sounds: Normal heart sounds, S1 normal and S2 normal. No murmur heard.     No friction rub. No gallop.   Pulmonary:      Effort: Pulmonary effort is normal. No respiratory distress.      Breath sounds: Normal breath sounds. No decreased breath sounds, wheezing, rhonchi or rales.   Chest:      Chest wall: No tenderness.   Abdominal:      General: Bowel sounds are normal. There is no distension.      Palpations: Abdomen is soft.      Tenderness: There is no abdominal tenderness. There is no guarding or rebound.   Musculoskeletal:      Cervical back: Normal range of motion and neck supple.   Skin:     General: Skin is warm and dry.      " Findings: No rash.   Neurological:      Mental Status: He is alert.      GCS: GCS eye subscore is 4. GCS verbal subscore is 5. GCS motor subscore is 6.   Psychiatric:         Mood and Affect: Mood normal.         Results Reviewed       Procedure Component Value Units Date/Time    HS Troponin 0hr (reflex protocol) [[id number]]  (Normal) Collected: 12/22/24 0740    Lab Status: Final result Specimen: Blood from Arm, Left Updated: 12/22/24 0813     hs TnI 0hr <2 ng/L     Basic metabolic panel [[id number]]  (Abnormal) Collected: 12/22/24 0740    Lab Status: Final result Specimen: Blood from Arm, Left Updated: 12/22/24 0809     Sodium 139 mmol/L      Potassium 3.4 mmol/L      Chloride 104 mmol/L      CO2 28 mmol/L      ANION GAP 7 mmol/L      BUN 14 mg/dL      Creatinine 0.85 mg/dL      Glucose 84 mg/dL      Calcium 9.3 mg/dL      eGFR 125 ml/min/1.73sq m     Narrative:      National Kid[employer] Foundation guidelines for Chronic Kidney Disease (CKD):     Stage 1 with normal or high GFR (GFR > 90 mL/min/1.73 square meters)    Stage 2 Mild CKD (GFR = 60-89 mL/min/1.73 square meters)    Stage 3A Moderate CKD (GFR = 45-59 mL/min/1.73 square meters)    Stage 3B Moderate CKD (GFR = 30-44 mL/min/1.73 square meters)    Stage 4 Severe CKD (GFR = 15-29 mL/min/1.73 square meters)    Stage 5 End Stage CKD (GFR <15 mL/min/1.73 square meters)  Note: GFR calculation is accurate only with a steady state creatinine    CBC and differential [[id number]]  (Abnormal) Collected: 12/22/24 0740    Lab Status: Final result Specimen: Blood from Arm, Left Updated: 12/22/24 0746     WBC 12.21 Thousand/uL      RBC 5.54 Million/uL      Hemoglobin 16.0 g/dL      Hematocrit 46.4 %      MCV 84 fL      MCH 28.9 pg      MCHC 34.5 g/dL      RDW 12.0 %      MPV 10.5 fL      Platelets 311 Thousands/uL      nRBC 0 /100 WBCs      Segmented % 62 %      Immature Grans % 0 %      Lymphocytes % 27 %      Monocytes % 9 %      Eosinophils Relative 1 %      Basophils  Relative 1 %      Absolute Neutrophils 7.49 Thousands/µL      Absolute Immature Grans 0.04 Thousand/uL      Absolute Lymphocytes 3.31 Thousands/µL      Absolute Monocytes 1.13 Thousand/µL      Eosinophils Absolute 0.12 Thousand/µL      Basophils Absolute 0.12 Thousands/µL             XR chest 2 views   ED Interpretation by Antonietta Galvan PA-C (12/22 0924)   No acute abnormalities seen by me a this time.          ECG 12 Lead Documentation Only    Date/Time: 12/22/2024 6:49 AM    Performed by: Antonietta Galvan PA-C  Authorized by: Antonietta Galvan PA-C    Indications / Diagnosis:  Chest pain  ECG reviewed by me, the ED Provider: yes (Also reviewed by attending)    Patient location:  ED  Interpretation:     Interpretation: normal    Rate:     ECG rate:  70    ECG rate assessment: normal    Rhythm:     Rhythm: sinus rhythm    Ectopy:     Ectopy: none    QRS:     QRS intervals:  Normal (94ms)  ST segments:     ST segments:  Normal  T waves:     T waves: inverted      Inverted:  AVR  Comments:      QTc 416ms      ED Medication and Procedure Management   Prior to Admission Medications   Prescriptions Last Dose Informant Patient Reported? Taking?   lidocaine (Lidoderm) 5 %   No Yes   Sig: Apply 1 patch topically over 12 hours daily Remove & Discard patch within 12 hours or as directed by MD   methocarbamol (ROBAXIN) 500 mg tablet   No Yes   Sig: Take 1 tablet (500 mg total) by mouth 2 (two) times a day   methylPREDNISolone 4 MG tablet therapy pack   No Yes   Sig: Use as directed on package   naproxen (NAPROSYN) 500 mg tablet Not Taking  No No   Sig: Take 1 tablet (500 mg total) by mouth every 12 (twelve) hours as needed for moderate pain or mild pain   Patient not taking: Reported on 12/22/2024      Facility-Administered Medications: None     Patient's Medications   Discharge Prescriptions    No medications on file     No discharge procedures on file.  ED SEPSIS DOCUMENTATION   Time reflects when  diagnosis was documented in both MDM as applicable and the Disposition within this note       Time User Action Codes Description Comment    12/22/2024  9:19 AM Antonietta Galvan Add [R07.9] Chest pain                  Antonietta Galvan PA-C  12/22/24 0922

## 2024-12-31 ENCOUNTER — APPOINTMENT (EMERGENCY)
Dept: RADIOLOGY | Facility: HOSPITAL | Age: 19
End: 2024-12-31
Payer: MEDICARE

## 2024-12-31 ENCOUNTER — HOSPITAL ENCOUNTER (EMERGENCY)
Facility: HOSPITAL | Age: 19
Discharge: HOME | End: 2024-12-31
Attending: EMERGENCY MEDICINE
Payer: MEDICARE

## 2024-12-31 VITALS
DIASTOLIC BLOOD PRESSURE: 76 MMHG | OXYGEN SATURATION: 98 % | SYSTOLIC BLOOD PRESSURE: 136 MMHG | WEIGHT: 315 LBS | TEMPERATURE: 97.5 F | HEART RATE: 79 BPM | RESPIRATION RATE: 18 BRPM

## 2024-12-31 DIAGNOSIS — R07.9 CHEST PAIN: Primary | ICD-10-CM

## 2024-12-31 LAB
ALBUMIN SERPL BCG-MCNC: 4.4 G/DL (ref 3.5–5)
ALP SERPL-CCNC: 56 U/L (ref 34–104)
ALT SERPL W P-5'-P-CCNC: 32 U/L (ref 7–52)
ANION GAP SERPL CALCULATED.3IONS-SCNC: 8 MMOL/L (ref 4–13)
AST SERPL W P-5'-P-CCNC: 18 U/L (ref 13–39)
ATRIAL RATE: 66 BPM
ATRIAL RATE: 99 BPM
BASOPHILS # BLD AUTO: 0.1 THOUSANDS/ΜL (ref 0–0.1)
BASOPHILS NFR BLD AUTO: 1 % (ref 0–1)
BILIRUB SERPL-MCNC: 0.53 MG/DL (ref 0.2–1)
BUN SERPL-MCNC: 13 MG/DL (ref 5–25)
CALCIUM SERPL-MCNC: 9.1 MG/DL (ref 8.4–10.2)
CARDIAC TROPONIN I PNL SERPL HS: <2 NG/L (ref ?–50)
CARDIAC TROPONIN I PNL SERPL HS: <2 NG/L (ref ?–50)
CHLORIDE SERPL-SCNC: 103 MMOL/L (ref 96–108)
CO2 SERPL-SCNC: 25 MMOL/L (ref 21–32)
CREAT SERPL-MCNC: 0.92 MG/DL (ref 0.6–1.3)
EOSINOPHIL # BLD AUTO: 0.32 THOUSAND/ΜL (ref 0–0.61)
EOSINOPHIL NFR BLD AUTO: 3 % (ref 0–6)
ERYTHROCYTE [DISTWIDTH] IN BLOOD BY AUTOMATED COUNT: 11.9 % (ref 11.6–15.1)
GFR SERPL CREATININE-BSD FRML MDRD: 119 ML/MIN/1.73SQ M
GLUCOSE SERPL-MCNC: 93 MG/DL (ref 65–140)
HCT VFR BLD AUTO: 43.1 % (ref 36.5–49.3)
HGB BLD-MCNC: 15 G/DL (ref 12–17)
IMM GRANULOCYTES # BLD AUTO: 0.03 THOUSAND/UL (ref 0–0.2)
IMM GRANULOCYTES NFR BLD AUTO: 0 % (ref 0–2)
LYMPHOCYTES # BLD AUTO: 2.5 THOUSANDS/ΜL (ref 0.6–4.47)
LYMPHOCYTES NFR BLD AUTO: 25 % (ref 14–44)
MCH RBC QN AUTO: 29.1 PG (ref 26.8–34.3)
MCHC RBC AUTO-ENTMCNC: 34.8 G/DL (ref 31.4–37.4)
MCV RBC AUTO: 84 FL (ref 82–98)
MONOCYTES # BLD AUTO: 0.89 THOUSAND/ΜL (ref 0.17–1.22)
MONOCYTES NFR BLD AUTO: 9 % (ref 4–12)
NEUTROPHILS # BLD AUTO: 6.38 THOUSANDS/ΜL (ref 1.85–7.62)
NEUTS SEG NFR BLD AUTO: 62 % (ref 43–75)
NRBC BLD AUTO-RTO: 0 /100 WBCS
P AXIS: 14 DEGREES
P AXIS: 2 DEGREES
PLATELET # BLD AUTO: 235 THOUSANDS/UL (ref 149–390)
PMV BLD AUTO: 10.3 FL (ref 8.9–12.7)
POTASSIUM SERPL-SCNC: 3.8 MMOL/L (ref 3.5–5.3)
PR INTERVAL: 162 MS
PR INTERVAL: 168 MS
PROT SERPL-MCNC: 7.2 G/DL (ref 6.4–8.4)
QRS AXIS: 40 DEGREES
QRS AXIS: 40 DEGREES
QRSD INTERVAL: 88 MS
QRSD INTERVAL: 92 MS
QT INTERVAL: 346 MS
QT INTERVAL: 392 MS
QTC INTERVAL: 410 MS
QTC INTERVAL: 444 MS
RBC # BLD AUTO: 5.15 MILLION/UL (ref 3.88–5.62)
SODIUM SERPL-SCNC: 136 MMOL/L (ref 135–147)
T WAVE AXIS: 2 DEGREES
T WAVE AXIS: 8 DEGREES
VENTRICULAR RATE: 66 BPM
VENTRICULAR RATE: 99 BPM
WBC # BLD AUTO: 10.22 THOUSAND/UL (ref 4.31–10.16)

## 2024-12-31 PROCEDURE — 93005 ELECTROCARDIOGRAM TRACING: CPT

## 2024-12-31 PROCEDURE — 71045 X-RAY EXAM CHEST 1 VIEW: CPT

## 2024-12-31 PROCEDURE — 96366 THER/PROPH/DIAG IV INF ADDON: CPT

## 2024-12-31 PROCEDURE — 93010 ELECTROCARDIOGRAM REPORT: CPT | Performed by: INTERNAL MEDICINE

## 2024-12-31 PROCEDURE — 96365 THER/PROPH/DIAG IV INF INIT: CPT

## 2024-12-31 PROCEDURE — 80053 COMPREHEN METABOLIC PANEL: CPT | Performed by: PHYSICIAN ASSISTANT

## 2024-12-31 PROCEDURE — 85025 COMPLETE CBC W/AUTO DIFF WBC: CPT | Performed by: PHYSICIAN ASSISTANT

## 2024-12-31 PROCEDURE — 93010 ELECTROCARDIOGRAM REPORT: CPT | Performed by: STUDENT IN AN ORGANIZED HEALTH CARE EDUCATION/TRAINING PROGRAM

## 2024-12-31 PROCEDURE — 84484 ASSAY OF TROPONIN QUANT: CPT | Performed by: PHYSICIAN ASSISTANT

## 2024-12-31 PROCEDURE — 96375 TX/PRO/DX INJ NEW DRUG ADDON: CPT

## 2024-12-31 PROCEDURE — 99285 EMERGENCY DEPT VISIT HI MDM: CPT | Performed by: PHYSICIAN ASSISTANT

## 2024-12-31 PROCEDURE — 36415 COLL VENOUS BLD VENIPUNCTURE: CPT | Performed by: PHYSICIAN ASSISTANT

## 2024-12-31 PROCEDURE — 99285 EMERGENCY DEPT VISIT HI MDM: CPT

## 2024-12-31 RX ORDER — KETOROLAC TROMETHAMINE 30 MG/ML
15 INJECTION, SOLUTION INTRAMUSCULAR; INTRAVENOUS ONCE
Status: COMPLETED | OUTPATIENT
Start: 2024-12-31 | End: 2024-12-31

## 2024-12-31 RX ORDER — FAMOTIDINE 10 MG/ML
20 INJECTION, SOLUTION INTRAVENOUS ONCE
Status: COMPLETED | OUTPATIENT
Start: 2024-12-31 | End: 2024-12-31

## 2024-12-31 RX ORDER — ONDANSETRON 2 MG/ML
4 INJECTION INTRAMUSCULAR; INTRAVENOUS ONCE
Status: COMPLETED | OUTPATIENT
Start: 2024-12-31 | End: 2024-12-31

## 2024-12-31 RX ADMIN — ONDANSETRON 4 MG: 2 INJECTION INTRAMUSCULAR; INTRAVENOUS at 04:48

## 2024-12-31 RX ADMIN — FAMOTIDINE 20 MG: 10 INJECTION, SOLUTION INTRAVENOUS at 04:48

## 2024-12-31 RX ADMIN — SODIUM CHLORIDE, SODIUM LACTATE, POTASSIUM CHLORIDE, AND CALCIUM CHLORIDE 1000 ML: .6; .31; .03; .02 INJECTION, SOLUTION INTRAVENOUS at 04:51

## 2024-12-31 RX ADMIN — KETOROLAC TROMETHAMINE 15 MG: 30 INJECTION, SOLUTION INTRAMUSCULAR; INTRAVENOUS at 04:49

## 2024-12-31 NOTE — ED CARE HANDOFF
Emergency Department Sign Out Note        Sign out and transfer of care from [facility]. See Separate Emergency Department note.     The patient, Chris Foreman, was evaluated by the previous provider for cp.    Workup Completed:  Labs and ekg    ED Course / Workup Pending (followup):  Signed to myself pending delta troponin. - troponin remains less than 2 - on repeat - discussed follow up with pcp.   Pt symptoms improved, lungs clear       HEART Risk Score      Flowsheet Row Most Recent Value   Heart Score Risk Calculator    History 0 Filed at: 12/31/2024 0602   ECG 0 Filed at: 12/31/2024 0602   Age 0 Filed at: 12/31/2024 0602   Risk Factors 0 Filed at: 12/31/2024 0602   Troponin 0 Filed at: 12/31/2024 0602   HEART Score 0 Filed at: 12/31/2024 0602                                    ED Course as of 12/31/24 0948   Tue Dec 31, 2024   0612 Signed to myself for delta troponin    0742 hs TnI 2hr: <2  negative   0815 Discussed result feeling better - instructions reviewed      Procedures  Medical Decision Making  Amount and/or Complexity of Data Reviewed  Labs: ordered. Decision-making details documented in ED Course.  Radiology: ordered and independent interpretation performed.    Risk  Prescription drug management.            Disposition  Final diagnoses:   Chest pain     Time reflects when diagnosis was documented in both MDM as applicable and the Disposition within this note       Time User Action Codes Description Comment    12/31/2024  6:16 AM Abby Escalante Add [R07.9] Chest pain           ED Disposition       ED Disposition   Discharge    Condition   Stable    Date/Time   Tue Dec 31, 2024 0616    Comment   Chris Foreman discharge to home/self care.               Follow-up Information       Follow up With Specialties Details Why Contact [facility] Schedule an appointment as soon as possible for a visit   [facility]  [address] [facility]  [address]  [place]  [address]  [phone]      Ronal Rose MD Internal Medicine Schedule an appointment as soon as possible for a visit   [address] [place] [address] [address]  [place] [address]  [phone]            Discharge Medication List as of 12/31/2024  8:16 AM        CONTINUE these medications which have NOT CHANGED    Details   lidocaine (Lidoderm) 5 % Apply 1 patch topically over 12 hours daily Remove & Discard patch within 12 hours or as directed by MD, Starting Wed 12/18/2024, Normal      methocarbamol (ROBAXIN) 500 mg tablet Take 1 tablet (500 mg total) by mouth 2 (two) times a day, Starting Wed 12/18/2024, Normal      methylPREDNISolone 4 MG tablet therapy pack Use as directed on package, Normal      naproxen (NAPROSYN) 500 mg tablet Take 1 tablet (500 mg total) by mouth every 12 (twelve) hours as needed for moderate pain or mild pain, Starting Sun 6/16/2024, Normal           No discharge procedures on file.       ED Provider  Electronically Signed by     Sandra Baker PA-C  12/31/24 0984

## 2024-12-31 NOTE — ED PROVIDER NOTES
Time reflects when diagnosis was documented in both MDM as applicable and the Disposition within this note       Time User Action Codes Description Comment    12/31/2024  6:16 AM Abby Escalante Add [R07.9] Chest pain           ED Disposition       ED Disposition   Discharge    Condition   Stable    Date/Time   Tue Dec 31, 2024  6:16 AM    Comment   Chris Foreman discharge to home/self care.               Assessment & Plan       Medical Decision Making      DDX including but not limited to: chest wall pain, pleurisy, costochondritis, pericarditis, myocarditis, PTX, pneumonia, GI etiology; doubt ACS or MI     Will order CBC for eval of anemia and leukocytosis, CMP for eval of LFTs, kidney function and electrolyte abnormalities.  Troponin for evaluation of heart strain, patient will need delta troponin symptom onset <3 hours pta. EKG for evaluation of arrhythmia and ACS.  CXR for evaluation of acute cardiopulmonary abnormalities.  Will treat symptomatically with Pepcid, Toradol and Zofran.  Will give 1 L of IV fluids.    Signout given to BETSY Kruseo pending delta troponin.  Patient will need follow-up with cardiology, provided with phone number and discharge instructions for cardiologist patient was seen in the past in chart.    Amount and/or Complexity of Data Reviewed  Labs: ordered. Decision-making details documented in ED Course.  Radiology: ordered and independent interpretation performed. Decision-making details documented in ED Course.    Risk  Prescription drug management.        ED Course as of 12/31/24 0621   Tue Dec 31, 2024   0531 hs TnI 0hr: <2  Will need delta   0615 XR chest 1 view portable  IMPRESSION:     No acute cardiopulmonary disease.       Medications   ketorolac (TORADOL) injection 15 mg (15 mg Intravenous Given 12/31/24 0449)   Famotidine (PF) (PEPCID) injection 20 mg (20 mg Intravenous Given 12/31/24 0448)   ondansetron (ZOFRAN) injection 4 mg (4 mg Intravenous Given 12/31/24  "6508)   lactated ringers bolus 1,000 mL (1,000 mL Intravenous New Bag 12/31/24 0451)       ED Risk Strat Scores      HEART Risk Score      Flowsheet Row Most Recent Value   Heart Score Risk Calculator    History 0 Filed at: 12/31/2024 0602   ECG 0 Filed at: 12/31/2024 0602   Age 0 Filed at: 12/31/2024 0602   Risk Factors 0 Filed at: 12/31/2024 0602   Troponin 0 Filed at: 12/31/2024 0602   HEART Score 0 Filed at: 12/31/2024 0602              CRAFFT      Flowsheet Row Most Recent Value   CRAFFT Initial Screen: During the past 12 months, did you:    1. Drink any alcohol (more than a few sips)?  No Filed at: 12/31/2024 0406   2. Smoke any marijuana or hashish No Filed at: 12/31/2024 0406   3. Use anything else to get high? (\"anything else\" includes illegal drugs, over the counter and prescription drugs, and things that you sniff or 'gomez')? No Filed at: 12/31/2024 0406                                          History of Present Illness       Chief Complaint   Patient presents with    Chest Pain     Patient c/o chest pain and nausea. Patient states pain is like pressure. Patient was seen here for similar issue last week and was told to come back if symptoms continue .        No past medical history on file.   No past surgical history on file.   No family history on file.   Social History     Tobacco Use    Smoking status: Never    Smokeless tobacco: Never   Vaping Use    Vaping status: Never Used   Substance Use Topics    Alcohol use: Not Currently    Drug use: Not Currently      E-Cigarette/Vaping    E-Cigarette Use Never User       E-Cigarette/Vaping Substances      I have reviewed and agree with the history as documented.     This is a 19-year-old male presenting to ED for evaluation of chest pain.  He states that chest pain is associated with nausea and dizziness.  He states that he was \"talking to his friends\" when symptoms started.  Patient denies having any stressful conversation with his friends or feeling " anxious.  He states that he has been having the symptoms since being 14 years old.  He states that recently symptoms seem worse.  He reports being seen in the emergency department last week for similar symptoms with a normal workup.  He denies any SOB or pleurisy.  He states that symptoms started about 1.5 hours prior to arrival this evening.  He states that sibling was recently diagnosed with pneumonia and he did just recently start with mild cough.  He does endorse generalized anterior abdominal pain as well with the nausea.      History provided by:  Patient   used: No        Review of Systems   Constitutional:  Negative for chills and fever.   Respiratory:  Negative for shortness of breath.    Cardiovascular:  Positive for chest pain and palpitations.   Gastrointestinal:  Positive for abdominal pain and nausea. Negative for vomiting.   Genitourinary:  Negative for dysuria, flank pain, frequency and urgency.   Neurological:  Positive for light-headedness (resolved). Negative for dizziness.   All other systems reviewed and are negative.          Objective       ED Triage Vitals [12/31/24 0404]   Temperature Pulse Blood Pressure Respirations SpO2 Patient Position - Orthostatic VS   97.5 °F (36.4 °C) 105 (!) 144/102 19 98 % --      Temp src Heart Rate Source BP Location FiO2 (%) Pain Score    -- -- -- -- --      Vitals      Date and Time Temp Pulse SpO2 Resp BP Pain Score FACES Pain Rating User   12/31/24 0404 97.5 °F (36.4 °C) 105 98 % 19 144/102 -- -- ZC            Physical Exam  Vitals reviewed.   Constitutional:       General: He is not in acute distress.     Appearance: Normal appearance. He is well-developed and well-groomed. He is obese. He is not ill-appearing, toxic-appearing or diaphoretic.   HENT:      Head: Normocephalic and atraumatic.      Right Ear: External ear normal.      Left Ear: External ear normal.      Nose: Nose normal. No congestion or rhinorrhea.      Mouth/Throat:       Mouth: Mucous membranes are moist.      Pharynx: Oropharynx is clear. No oropharyngeal exudate or posterior oropharyngeal erythema.   Eyes:      General: No scleral icterus.        Right eye: No discharge.         Left eye: No discharge.      Extraocular Movements: Extraocular movements intact.      Conjunctiva/sclera: Conjunctivae normal.   Cardiovascular:      Rate and Rhythm: Normal rate and regular rhythm.      Pulses: Normal pulses.      Heart sounds: No murmur heard.     No friction rub. No gallop.   Pulmonary:      Effort: Pulmonary effort is normal. No respiratory distress.      Breath sounds: Normal breath sounds. No wheezing, rhonchi or rales.   Abdominal:      General: Abdomen is flat. There is no distension.      Palpations: Abdomen is soft.      Tenderness: There is no abdominal tenderness. There is no right CVA tenderness, left CVA tenderness, guarding or rebound.   Musculoskeletal:         General: No deformity. Normal range of motion.      Cervical back: Normal range of motion and neck supple.   Skin:     General: Skin is warm and dry.      Coloration: Skin is not jaundiced or pale.      Findings: No rash.   Neurological:      General: No focal deficit present.      Mental Status: He is alert.   Psychiatric:         Mood and Affect: Mood normal.         Behavior: Behavior normal. Behavior is cooperative.         Results Reviewed       Procedure Component Value Units Date/Time    HS Troponin I 2hr [[id number]]     Lab Status: No result Specimen: Blood     HS Troponin 0hr (reflex protocol) [[id number]]  (Normal) Collected: 12/31/24 0442    Lab Status: Final result Specimen: Blood from Arm, Right Updated: 12/31/24 0520     hs TnI 0hr <2 ng/L     Comprehensive metabolic panel [[id number]] Collected: 12/31/24 0442    Lab Status: Final result Specimen: Blood from Arm, Right Updated: 12/31/24 0514     Sodium 136 mmol/L      Potassium 3.8 mmol/L      Chloride 103 mmol/L      CO2 25 mmol/L      ANION GAP 8  mmol/L      BUN 13 mg/dL      Creatinine 0.92 mg/dL      Glucose 93 mg/dL      Calcium 9.1 mg/dL      AST 18 U/L      ALT 32 U/L      Alkaline Phosphatase 56 U/L      Total Protein 7.2 g/dL      Albumin 4.4 g/dL      Total Bilirubin 0.53 mg/dL      eGFR 119 ml/min/1.73sq m     Narrative:      [employer] guidelines for Chronic Kidney Disease (CKD):     Stage 1 with normal or high GFR (GFR > 90 mL/min/1.73 square meters)    Stage 2 Mild CKD (GFR = 60-89 mL/min/1.73 square meters)    Stage 3A Moderate CKD (GFR = 45-59 mL/min/1.73 square meters)    Stage 3B Moderate CKD (GFR = 30-44 mL/min/1.73 square meters)    Stage 4 Severe CKD (GFR = 15-29 mL/min/1.73 square meters)    Stage 5 End Stage CKD (GFR <15 mL/min/1.73 square meters)  Note: GFR calculation is accurate only with a steady state creatinine    CBC and differential [[id number]]  (Abnormal) Collected: 12/31/24 0442    Lab Status: Final result Specimen: Blood from Arm, Right Updated: 12/31/24 0500     WBC 10.22 Thousand/uL      RBC 5.15 Million/uL      Hemoglobin 15.0 g/dL      Hematocrit 43.1 %      MCV 84 fL      MCH 29.1 pg      MCHC 34.8 g/dL      RDW 11.9 %      MPV 10.3 fL      Platelets 235 Thousands/uL      nRBC 0 /100 WBCs      Segmented % 62 %      Immature Grans % 0 %      Lymphocytes % 25 %      Monocytes % 9 %      Eosinophils Relative 3 %      Basophils Relative 1 %      Absolute Neutrophils 6.38 Thousands/µL      Absolute Immature Grans 0.03 Thousand/uL      Absolute Lymphocytes 2.50 Thousands/µL      Absolute Monocytes 0.89 Thousand/µL      Eosinophils Absolute 0.32 Thousand/µL      Basophils Absolute 0.10 Thousands/µL             XR chest 1 view portable   ED Interpretation by Abby Escalante PA-C (12/31 0532)   No acute cardiopulmonary disease as interpreted by me at this time.      Final Interpretation by Stevan Chvaez MD (12/31 0611)      No acute cardiopulmonary disease.            Workstation performed:  [id number]             ECG 12 Lead Documentation Only    Date/Time: 12/31/2024 6:02 AM    Performed by: Abby Escalante PA-C  Authorized by: Abby Escalante PA-C    Indications / Diagnosis:  Chest Pain  ECG reviewed by me, the ED Provider: yes    Patient location:  ED  Previous ECG:     Previous ECG:  Compared to current    Comparison to cardiac monitor: No    Interpretation:     Interpretation: normal    Quality:     Tracing quality:  Limited by artifact  Rate:     ECG rate:  99    ECG rate assessment: normal    Rhythm:     Rhythm: sinus rhythm    Ectopy:     Ectopy: none    QRS:     QRS axis:  Normal    QRS intervals:  Normal  Conduction:     Conduction: normal    ST segments:     ST segments:  Normal  T waves:     T waves: normal        ED Medication and Procedure Management   Prior to Admission Medications   Prescriptions Last Dose Informant Patient Reported? Taking?   lidocaine (Lidoderm) 5 %   No No   Sig: Apply 1 patch topically over 12 hours daily Remove & Discard patch within 12 hours or as directed by MD   methocarbamol (ROBAXIN) 500 mg tablet   No No   Sig: Take 1 tablet (500 mg total) by mouth 2 (two) times a day   methylPREDNISolone 4 MG tablet therapy pack   No No   Sig: Use as directed on package   naproxen (NAPROSYN) 500 mg tablet   No No   Sig: Take 1 tablet (500 mg total) by mouth every 12 (twelve) hours as needed for moderate pain or mild pain   Patient not taking: Reported on 12/22/2024      Facility-Administered Medications: None     Current Discharge Medication List        CONTINUE these medications which have NOT CHANGED    Details   lidocaine (Lidoderm) 5 % Apply 1 patch topically over 12 hours daily Remove & Discard patch within 12 hours or as directed by MD  Qty: 30 patch, Refills: 0    Associated Diagnoses: Back pain      methocarbamol (ROBAXIN) 500 mg tablet Take 1 tablet (500 mg total) by mouth 2 (two) times a day  Qty: 20 tablet, Refills: 0    Associated Diagnoses: Back pain       methylPREDNISolone 4 MG tablet therapy pack Use as directed on package  Qty: 21 tablet, Refills: 0    Associated Diagnoses: Back pain      naproxen (NAPROSYN) 500 mg tablet Take 1 tablet (500 mg total) by mouth every 12 (twelve) hours as needed for moderate pain or mild pain  Qty: 15 tablet, Refills: 0    Associated Diagnoses: Cellulitis of left upper extremity           No discharge procedures on file.  ED SEPSIS DOCUMENTATION   Time reflects when diagnosis was documented in both MDM as applicable and the Disposition within this note       Time User Action Codes Description Comment    12/31/2024  6:16 AM Abby Escalante Add [R07.9] Chest pain            Initial Sepsis Screening       Row Name 12/31/24 0531                Is the patient's history suggestive of a new or worsening infection? No  -CM                  User Key  (r) = Recorded By, (t) = Taken By, (c) = Cosigned By      Initials Name Provider Type    CM Abby Escalante PA-C Physician Assistant                       Abby Escalante PA-C  12/31/24 9784

## 2025-04-07 ENCOUNTER — APPOINTMENT (EMERGENCY)
Dept: RADIOLOGY | Facility: HOSPITAL | Age: 20
End: 2025-04-07
Payer: MEDICARE

## 2025-04-07 ENCOUNTER — HOSPITAL ENCOUNTER (EMERGENCY)
Facility: HOSPITAL | Age: 20
Discharge: HOME | End: 2025-04-07
Attending: EMERGENCY MEDICINE
Payer: MEDICARE

## 2025-04-07 VITALS
DIASTOLIC BLOOD PRESSURE: 102 MMHG | HEART RATE: 109 BPM | RESPIRATION RATE: 17 BRPM | OXYGEN SATURATION: 99 % | WEIGHT: 315 LBS | SYSTOLIC BLOOD PRESSURE: 150 MMHG | TEMPERATURE: 97.6 F

## 2025-04-07 DIAGNOSIS — S63.501A SPRAIN OF RIGHT WRIST, INITIAL ENCOUNTER: Primary | ICD-10-CM

## 2025-04-07 DIAGNOSIS — D16.00: ICD-10-CM

## 2025-04-07 PROCEDURE — 73110 X-RAY EXAM OF WRIST: CPT

## 2025-04-07 PROCEDURE — 99284 EMERGENCY DEPT VISIT MOD MDM: CPT

## 2025-04-07 PROCEDURE — 73090 X-RAY EXAM OF FOREARM: CPT

## 2025-04-07 PROCEDURE — 99283 EMERGENCY DEPT VISIT LOW MDM: CPT

## 2025-04-07 RX ORDER — IBUPROFEN 600 MG/1
600 TABLET, FILM COATED ORAL ONCE
Status: COMPLETED | OUTPATIENT
Start: 2025-04-07 | End: 2025-04-07

## 2025-04-07 RX ADMIN — IBUPROFEN 600 MG: 600 TABLET, FILM COATED ORAL at 13:46

## 2025-04-07 NOTE — ED PROVIDER NOTES
Time reflects when diagnosis was documented in both MDM as applicable and the Disposition within this note       Time User Action Codes Description Comment    4/7/2025  2:11 PM Harriett Burt Add [S63.501A] Sprain of right wrist, initial encounter     4/7/2025  2:11 PM Harriett Burt Add [D16.00] Osteochondroma of humerus           ED Disposition       ED Disposition   Discharge    Condition   Stable    Date/Time   Mon Apr 7, 2025  2:11 PM    Comment   Chris Foreman discharge to home/self care.                   Assessment & Plan       Medical Decision Making  Patient is a 19-year-old male presenting with right wrist/forearm pain after a fall just prior to arrival.  He is hypertensive and slightly tachycardic on arrival but he is in no acute distress.  He is neurovascularly intact in the affected extremity.  Will obtain x-ray to evaluate for fracture versus sprain.    X-ray without acute osseous abnormality.  It does show incidental osteochondroma which I discussed with patient.  Will place in prefabricated wrist brace.  Provided contact information and provided referral for orthopedics for follow-up.  Advised to use Motrin and Tylenol as needed for pain.    I have discussed findings and plan for discharge with the patient/caregiver. Follow up with the appropriate providers including primary care physician was discussed. Return precautions discussed with patient/caregiver as outlined in AVS. Patient/caregiver verbally expressed understanding. Patient stable at time of discharge and ambulated out of the emergency department.     Amount and/or Complexity of Data Reviewed  Radiology: ordered.    Risk  Prescription drug management.             Medications   ibuprofen (MOTRIN) tablet 600 mg (600 mg Oral Given 4/7/25 1346)       ED Risk Strat Scores              ERIKFFT      Flowsheet Row Most Recent Value   HALIMA Initial Screen: During the past 12 months, did you:    1. Drink any alcohol (more than a few sips)?  No  "Filed at: 04/07/2025 1322   2. Smoke any marijuana or hashish No Filed at: 04/07/2025 1322   3. Use anything else to get high? (\"anything else\" includes illegal drugs, over the counter and prescription drugs, and things that you sniff or 'gomez')? No Filed at: 04/07/2025 1322                                          History of Present Illness       Chief Complaint   Patient presents with    Wrist Injury     Pt states \"I was messing around and fell on my wrist\" reports right wrist pain       History reviewed. No pertinent past medical history.   History reviewed. No pertinent surgical history.   History reviewed. No pertinent family history.   Social History     Tobacco Use    Smoking status: Never    Smokeless tobacco: Never   Vaping Use    Vaping status: Never Used   Substance Use Topics    Alcohol use: Not Currently    Drug use: Not Currently      E-Cigarette/Vaping    E-Cigarette Use Never User       E-Cigarette/Vaping Substances      I have reviewed and agree with the history as documented.     Patient is a 19-year-old male presenting with right wrist pain after a fall that occurred shortly prior to arrival.  Pain is along the dorsal aspect of the wrist.  No notes or numbness.  No other injuries.  He tried to ice it at home without relief.          Review of Systems   Constitutional:  Negative for chills and fever.   HENT:  Negative for ear pain and sore throat.    Eyes:  Negative for pain and visual disturbance.   Respiratory:  Negative for cough and shortness of breath.    Cardiovascular:  Negative for chest pain and leg swelling.   Gastrointestinal:  Negative for abdominal pain, diarrhea, nausea and vomiting.   Genitourinary:  Negative for dysuria and flank pain.   Musculoskeletal:  Negative for back pain and neck pain.   Skin:  Negative for rash.   Neurological:  Negative for dizziness and headaches.   Psychiatric/Behavioral:  Negative for confusion.            Objective       ED Triage Vitals   Temperature " Pulse Blood Pressure Respirations SpO2 Patient Position - Orthostatic VS   04/07/25 1314 04/07/25 1313 04/07/25 1314 04/07/25 1314 04/07/25 1314 04/07/25 1314   97.6 °F (36.4 °C) (!) 109 (!) 150/102 17 99 % Sitting      Temp Source Heart Rate Source BP Location FiO2 (%) Pain Score    04/07/25 1314 -- 04/07/25 1314 -- 04/07/25 1346    Oral  Right arm  7      Vitals      Date and Time Temp Pulse SpO2 Resp BP Pain Score FACES Pain Rating User   04/07/25 1346 -- -- -- -- -- 7 -- JS   04/07/25 1314 97.6 °F (36.4 °C) -- 99 % 17 150/102 -- -- LP   04/07/25 1313 -- 109 -- -- -- -- -- LP            Physical Exam  Vitals and nursing note reviewed.   Constitutional:       General: He is not in acute distress.     Appearance: Normal appearance.   HENT:      Head: Normocephalic and atraumatic.      Right Ear: External ear normal.      Left Ear: External ear normal.      Nose: Nose normal.   Eyes:      General: No scleral icterus.        Right eye: No discharge.         Left eye: No discharge.   Cardiovascular:      Rate and Rhythm: Normal rate.      Pulses: Normal pulses.   Pulmonary:      Effort: Pulmonary effort is normal. No respiratory distress.   Musculoskeletal:         General: No deformity or signs of injury.      Right forearm: Tenderness present. No swelling or edema.      Right wrist: Tenderness present. No swelling or snuff box tenderness. Normal range of motion.      Cervical back: Normal range of motion and neck supple.      Comments: Tenderness over dorsal wrist and forearm.  He is able to flex and extend the wrist and elbow.  Normal sensation distally.  Radial pulse 2+.  Strength intact with , finger adduction, opposition.   Skin:     General: Skin is dry.      Coloration: Skin is not jaundiced.      Findings: No erythema or rash.   Neurological:      General: No focal deficit present.      Mental Status: He is alert and oriented to person, place, and time. Mental status is at baseline.      Motor: No  weakness.      Gait: Gait normal.   Psychiatric:         Mood and Affect: Mood normal.         Behavior: Behavior normal.         Thought Content: Thought content normal.         Results Reviewed       None            XR wrist 3+ views RIGHT   Final Interpretation by Clemente Moran MD (04/07 1405)      No acute osseous abnormality.         Computerized Assisted Algorithm (CAA) may have been used to analyze all applicable images.            Workstation performed: [id number]         XR forearm 2 views RIGHT   Final Interpretation by Clemente Moran MD (04/07 9338)      No acute osseous abnormality.   Incidental finding of benign-appearing osteochondroma arising from the distal humeral shaft.      Computerized Assisted Algorithm (CAA) may have been used to analyze all applicable images.            Workstation performed: [id number]             Procedures    ED Medication and Procedure Management   Prior to Admission Medications   Prescriptions Last Dose Informant Patient Reported? Taking?   lidocaine (Lidoderm) 5 %   No No   Sig: Apply 1 patch topically over 12 hours daily Remove & Discard patch within 12 hours or as directed by MD   methocarbamol (ROBAXIN) 500 mg tablet   No No   Sig: Take 1 tablet (500 mg total) by mouth 2 (two) times a day   methylPREDNISolone 4 MG tablet therapy pack   No No   Sig: Use as directed on package   naproxen (NAPROSYN) 500 mg tablet   No No   Sig: Take 1 tablet (500 mg total) by mouth every 12 (twelve) hours as needed for moderate pain or mild pain   Patient not taking: Reported on 12/22/2024      Facility-Administered Medications: None     Discharge Medication List as of 4/7/2025  2:13 PM        CONTINUE these medications which have NOT CHANGED    Details   lidocaine (Lidoderm) 5 % Apply 1 patch topically over 12 hours daily Remove & Discard patch within 12 hours or as directed by MD, Starting Wed 12/18/2024, Normal      methocarbamol (ROBAXIN) 500 mg tablet Take 1  tablet (500 mg total) by mouth 2 (two) times a day, Starting Wed 12/18/2024, Normal      methylPREDNISolone 4 MG tablet therapy pack Use as directed on package, Normal      naproxen (NAPROSYN) 500 mg tablet Take 1 tablet (500 mg total) by mouth every 12 (twelve) hours as needed for moderate pain or mild pain, Starting Sun 6/16/2024, Normal             ED SEPSIS DOCUMENTATION   Time reflects when diagnosis was documented in both MDM as applicable and the Disposition within this note       Time User Action Codes Description Comment    4/7/2025  2:11 PM Harriett Burt [S63.501A] Sprain of right wrist, initial encounter     4/7/2025  2:11 PM Harriett Burt [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

## 2025-04-07 NOTE — DISCHARGE INSTRUCTIONS
Wear wrist brace for support.  Use Motrin Tylenol as needed for pain.  Follow-up with orthopedics for wrist sprain and incidental finding of osteochondroma, which is a benign growth in your bone.

## 2025-06-28 PROCEDURE — 99284 EMERGENCY DEPT VISIT MOD MDM: CPT

## 2025-06-29 ENCOUNTER — HOSPITAL ENCOUNTER (EMERGENCY)
Facility: HOSPITAL | Age: 20
Discharge: HOME | End: 2025-06-29
Payer: COMMERCIAL

## 2025-06-29 ENCOUNTER — APPOINTMENT (EMERGENCY)
Dept: CT IMAGING | Facility: HOSPITAL | Age: 20
End: 2025-06-29
Payer: COMMERCIAL

## 2025-06-29 VITALS
HEART RATE: 104 BPM | OXYGEN SATURATION: 96 % | RESPIRATION RATE: 18 BRPM | TEMPERATURE: 97.8 F | DIASTOLIC BLOOD PRESSURE: 79 MMHG | WEIGHT: 315 LBS | SYSTOLIC BLOOD PRESSURE: 151 MMHG

## 2025-06-29 DIAGNOSIS — S09.90XA CLOSED HEAD INJURY, INITIAL ENCOUNTER: Primary | ICD-10-CM

## 2025-06-29 PROCEDURE — 70486 CT MAXILLOFACIAL W/O DYE: CPT

## 2025-06-29 PROCEDURE — 70450 CT HEAD/BRAIN W/O DYE: CPT

## 2025-06-29 PROCEDURE — 72125 CT NECK SPINE W/O DYE: CPT

## 2025-06-29 PROCEDURE — 99285 EMERGENCY DEPT VISIT HI MDM: CPT

## 2025-06-29 RX ORDER — ACETAMINOPHEN 325 MG/1
975 TABLET ORAL ONCE
Status: COMPLETED | OUTPATIENT
Start: 2025-06-29 | End: 2025-06-29

## 2025-06-29 RX ADMIN — ACETAMINOPHEN 975 MG: 325 TABLET ORAL at 00:23
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
